# Patient Record
Sex: MALE | Race: WHITE
[De-identification: names, ages, dates, MRNs, and addresses within clinical notes are randomized per-mention and may not be internally consistent; named-entity substitution may affect disease eponyms.]

---

## 2018-02-27 ENCOUNTER — HOSPITAL ENCOUNTER (OUTPATIENT)
Dept: HOSPITAL 88 - CT | Age: 58
End: 2018-02-27
Payer: COMMERCIAL

## 2018-02-27 DIAGNOSIS — K63.9: Primary | ICD-10-CM

## 2018-02-27 PROCEDURE — 74177 CT ABD & PELVIS W/CONTRAST: CPT

## 2018-02-27 NOTE — DIAGNOSTIC IMAGING REPORT
PROCEDURE: CT ABDOMEN AND PELVIS WITH CONTRAST

 

TECHNIQUE: 

The abdomen and pelvis were scanned utilizing a multidetector helical 

scanner from the diaphragm to the lesser trochanter after the IV 

administration of 100 cc of Isovue 370 and the oral administration of 

Gastrografin.  Coronal and sagittal multiplanar reformations were 

obtained.

 

COMPARISON: None.

 

INDICATIONS:   OBSTRUCTION

 

FINDINGS:

LOWER THORAX: Normal.

 

HEPATOBILIARY: Diffuse hepatic steatosis. No focal hepatic lesions.  No 

biliary ductal dilatation.

SPLEEN: No splenomegaly.

PANCREAS: No focal masses or ductal dilatation.

 

ADRENALS: No adrenal nodules.

KIDNEYS/URETERS: No hydronephrosis, stones, or solid mass lesions.

PELVIC ORGANS/BLADDER: Unremarkable.

 

PERITONEUM / RETROPERITONEUM: No free air or fluid.

LYMPH NODES: No lymphadenopathy.

VESSELS: Unremarkable.

 

GI TRACT: No distention or wall thickening. Scattered sigmoid 

diverticula without evidence of diverticulitis.

 

BONES AND SOFT TISSUES: No suspicious lytic or sclerotic bony lesions. 

Bilateral fat containing inguinal hernias, left greater than right.

 

IMPRESSION:

 

1. Normal obstruction.

2. Diffuse hepatic steatosis.

3. Sigmoid diverticulosis without diverticulitis.

4. Bilateral fat containing inguinal hernia, left greater than right. 

 

Dictated by:  Deondre Squires M.D. on 2/27/2018 at 15:20     

Electronically approved by:  Deondre Squires M.D. on 2/27/2018 at 15:20